# Patient Record
(demographics unavailable — no encounter records)

---

## 2025-06-06 NOTE — PHYSICAL EXAM
[General Appearance - Well Nourished] : well nourished [General Appearance - Well Developed] : well developed [Sclera] : the sclera and conjunctiva were normal [Hearing Threshold Finger Rub Not [place]] : hearing was normal [] : no rash [Skin Lesions] : no skin lesions [Affect] : the affect was normal [Mood] : the mood was normal [Nail Clubbing] : no clubbing  or cyanosis of the fingernails [Musculoskeletal - Swelling] : no joint swelling seen [Motor Tone] : muscle strength and tone were normal

## 2025-06-06 NOTE — PHYSICAL EXAM
[General Appearance - Well Nourished] : well nourished [General Appearance - Well Developed] : well developed [Sclera] : the sclera and conjunctiva were normal [Hearing Threshold Finger Rub Not [place]] : hearing was normal [] : no rash [Skin Lesions] : no skin lesions [Affect] : the affect was normal [Mood] : the mood was normal [Nail Clubbing] : no clubbing  or cyanosis of the fingernails [Musculoskeletal - Swelling] : no joint swelling seen [Motor Tone] : muscle strength and tone were normal

## 2025-06-10 NOTE — ASSESSMENT
[FreeTextEntry1] : 46 yo woman with lumbar DDD and L4-5 herniated disc with radicular pain and cervical paraspinal spasm.  she has been reducing her gabapentin on her own and she feels about the same.  patient has chronic back pain with improved radicular symptoms.   --reduce gabapentin to 200 at bedtime  --Flexeril 5mg and Nabumetone PRN --heat and light message to the cervical area   --encourage wt loss  --encourage home PT   tennis elbow:  start Nabumetone for 14 days and use tennis elbow strap while her arm is in use.   --if no relief she is to consider seeing hand surgery for possible injection  XR knee and elbow

## 2025-06-10 NOTE — REVIEW OF SYSTEMS
[Fever] : no fever [Chills] : no chills [Eye Pain] : no eye pain [Red Eyes] : eyes not red [Nosebleeds] : no nosebleeds [Nasal Discharge] : no nasal discharge [Chest Pain] : no chest pain [Palpitations] : no palpitations [Diarrhea] : no diarrhea

## 2025-06-10 NOTE — HISTORY OF PRESENT ILLNESS
[FreeTextEntry1] : 49 yo woman with obesity, GERD, HTN and constipation with diverticulitis, iron deficiency anemia presents for evaluation of positive CLARISSE 1:40 and lower back pain.   repeat CLARISSE is negative  she is RF/CCP , dsDNA, Ro, La, HLA b27 negative  No evidence of CTD   MRI showing L4-5 and L5-S1 DDD with herniated dics at L4-5 on the right side.  patient does complain of some radicular pain to the right buttocks and leg but this has greatly improved   she is on gabapentin 300 qhs. no longer requires nabumetone. rarely needs flexeril.  lower back pain better but still suffers daily backpain.  She has adjusted to this.  she is now able to bend and pull up her underwear which she could not previously.   denies any saddle paresthesia and has urinary accidents due to waiting too long to urinate. no lower extremity weakness  patient has tried PT and continues to do daily excercises. she saw pain management and told her she was not a candidate for injections.    patient denies any sausage digit, psoriasis, rashes, nail changes, dandruff, history of STDs, IBD s/s, red painful eye or inflammatory eye issues.   patient has a sister with RA   6/6/2025: Patient's back pain much improved.  she continues to take Gabapentin 300 at bedtime but does not require any other pain medication.  she denies any radicular pain at present.  She does complaint of left lateral elbow pain and bilateral knee pain.  Knee pain worse with squating and elbow pain with arm use.  she had a normal knee XR 2023.  she had duplex of the LE 2023 which shows right knee baker cyst   [facility] xray: significant narrowing of L5-S1 disc space with vacuum disc phenomenon. disc space narrowing L4-5.  associated small anterior disc margin osteophytes  SI : normal  MRI showingL4-5 and L5-S1 DDD with herniated dics at L4-5  labs:  HLA b27/rf/ccp negative ESR /CRP normal  negative CLARISSE/dsDNA/ro/la/ UA trace protein 180 mg  cmp alt 50  cbc normal  HIV/HCV/quantiferon Hep b ag negative 12/2021

## 2025-06-10 NOTE — ASSESSMENT
[FreeTextEntry1] : 48 yo woman with lumbar DDD and L4-5 herniated disc with radicular pain and cervical paraspinal spasm.  she has been reducing her gabapentin on her own and she feels about the same.  patient has chronic back pain with improved radicular symptoms.   --reduce gabapentin to 200 at bedtime  --Flexeril 5mg and Nabumetone PRN --heat and light message to the cervical area   --encourage wt loss  --encourage home PT   tennis elbow:  start Nabumetone for 14 days and use tennis elbow strap while her arm is in use.   --if no relief she is to consider seeing hand surgery for possible injection  XR knee and elbow